# Patient Record
Sex: FEMALE | Race: WHITE
[De-identification: names, ages, dates, MRNs, and addresses within clinical notes are randomized per-mention and may not be internally consistent; named-entity substitution may affect disease eponyms.]

---

## 2019-08-29 ENCOUNTER — HOSPITAL ENCOUNTER (INPATIENT)
Dept: HOSPITAL 1 - ED | Age: 69
LOS: 1 days | Discharge: HOME | DRG: 206 | End: 2019-08-30
Attending: HOSPITALIST | Admitting: HOSPITALIST
Payer: COMMERCIAL

## 2019-08-29 VITALS — DIASTOLIC BLOOD PRESSURE: 51 MMHG | SYSTOLIC BLOOD PRESSURE: 105 MMHG

## 2019-08-29 VITALS — DIASTOLIC BLOOD PRESSURE: 68 MMHG | SYSTOLIC BLOOD PRESSURE: 147 MMHG

## 2019-08-29 VITALS
HEIGHT: 62 IN | WEIGHT: 259.5 LBS | WEIGHT: 259.5 LBS | BODY MASS INDEX: 47.75 KG/M2 | HEIGHT: 62 IN | BODY MASS INDEX: 47.75 KG/M2

## 2019-08-29 VITALS — SYSTOLIC BLOOD PRESSURE: 116 MMHG | DIASTOLIC BLOOD PRESSURE: 56 MMHG

## 2019-08-29 VITALS — DIASTOLIC BLOOD PRESSURE: 60 MMHG | SYSTOLIC BLOOD PRESSURE: 113 MMHG

## 2019-08-29 DIAGNOSIS — E03.9: ICD-10-CM

## 2019-08-29 DIAGNOSIS — I48.2: ICD-10-CM

## 2019-08-29 DIAGNOSIS — I10: ICD-10-CM

## 2019-08-29 DIAGNOSIS — I07.1: ICD-10-CM

## 2019-08-29 DIAGNOSIS — E83.51: ICD-10-CM

## 2019-08-29 DIAGNOSIS — F41.9: ICD-10-CM

## 2019-08-29 DIAGNOSIS — M94.0: Primary | ICD-10-CM

## 2019-08-29 DIAGNOSIS — E87.6: ICD-10-CM

## 2019-08-29 DIAGNOSIS — Z79.01: ICD-10-CM

## 2019-08-29 LAB
ALBUMIN SERPL-MCNC: 3 G/DL (ref 3.4–5)
ALP SERPL-CCNC: 91 U/L (ref 46–116)
ALT SERPL-CCNC: 14 U/L (ref 14–59)
AST SERPL-CCNC: 13 U/L (ref 15–37)
BASOPHILS NFR BLD: 0.2 % (ref 0–2)
BILIRUB SERPL-MCNC: 0.4 MG/DL (ref 0.2–1)
BUN SERPL-MCNC: 21 MG/DL (ref 7–18)
CALCIUM SERPL-MCNC: 8.2 MG/DL (ref 8.5–10.1)
CHLORIDE SERPL-SCNC: 106 MMOL/L (ref 98–107)
CHOLEST SERPL-MCNC: 149 MG/DL (ref ?–200)
CHOLEST/HDLC SERPL: 2.9 MG/DL
CO2 SERPL-SCNC: 23.8 MMOL/L (ref 21–32)
CREAT SERPL-MCNC: 0.9 MG/DL (ref 0.6–1)
ERYTHROCYTE [DISTWIDTH] IN BLOOD BY AUTOMATED COUNT: 16.3 % (ref 11.5–14.5)
GFR SERPLBLD BASED ON 1.73 SQ M-ARVRAT: > 60 ML/MIN
GLUCOSE SERPL-MCNC: 112 MG/DL (ref 74–106)
HDLC SERPL-MCNC: 51 MG/DL (ref 40–60)
MAGNESIUM SERPL-MCNC: 2.3 MG/DL (ref 1.8–2.4)
PHOSPHATE SERPL-MCNC: 3.5 MG/DL (ref 2.5–4.9)
PLATELET # BLD: 346 X10^3MCL (ref 130–400)
POTASSIUM SERPL-SCNC: 4.1 MMOL/L (ref 3.5–5.1)
PROT SERPL-MCNC: 7.2 G/DL (ref 6.4–8.2)
SODIUM SERPL-SCNC: 141 MMOL/L (ref 136–145)
TRIGL SERPL-MCNC: 38 MG/DL (ref ?–150)

## 2019-08-29 PROCEDURE — G0378 HOSPITAL OBSERVATION PER HR: HCPCS

## 2019-08-29 NOTE — NUR
PATIENT UP TO BATHROOM AND BACK TO BED, NO COMPLAIN. FAMILY MEMBER REMAIN AT
BEDSIDE. PT TOLERATED CARDIAC DIET, NO N/V. CONT TO MONITOR.

## 2019-08-29 NOTE — NUR
PATIENT SAT UP IN CHAIR EATING HER DINNER NO COMPLAINS, FAMILY MEMBERS AT
BEDSIDE. XARELTO PO ADMINISTERED. NEEDS MET. CALL LIGHT WITHIN REACH.

## 2019-08-29 NOTE — NUR
PATIENT RESTING IN BED WITH FAMILY MEMBERS AT BEDSIDE, DR. GRANGER SEEN PATIENT
FOR CONSULT AT THIS TIME.

## 2019-08-29 NOTE — NUR
RECEIVED PATIENT FROM ED. AWAKE/ALERT/ORIENTED X4, WALK WITH WALKER TO
BATHROOM AND BACK TO BED VERY STEADY GAIT, NO WEAKNESS NOTED. C/O HEADACHE
10/10. INFORM PATIENT WILL GET PATIENT ADMITTED AND WILL GIVE SOMETHING FOR
HA, DTR AT BEDSIDE. TELE #17 PLACE ON PATIENT, AFIB W/ HR 81, CONT TO MONITOR.

## 2019-08-29 NOTE — NUR
PT C/O INSOMNIA, STATES FEELING VERY ANXIOUS. RECEIVED ORDER FROM DR CRISTOBAL
FOR WRAREN. ALEXIEN GIVEN PER MAR. WILL CONTINUE TO MONITOR.

## 2019-08-29 NOTE — NUR
DR. CAIN ROUND WITH RESIDENT DISCUSS POC WITH PATIENT AND FAMILY MEMBERS AT
BEDSIDE, PLAN FOR CARDIAC WORKUP AND CARDIO CONSULT.

## 2019-08-29 NOTE — NUR
REPORT GIVEN TO CESAR CINTRON, UPDATE DON STATUS, ALBS AND VITALS. PT STABLE FOR
TRANSFER. PT IN NAD.

## 2019-08-29 NOTE — NUR
PT STATES RELIEF OF H/A PAIN. RATES PAIN AT 2/10. PT C/O ANXIETY. RECEIVED
ORDER FROM DR CRISTOBAL FOR ATIVAN PO. MEDICATED WITH ATIVAN PRN PER MAR. WILL
CONTINUE TO MONITOR.

## 2019-08-29 NOTE — NUR
PT PRESENTS TO ER TODAY WITH C/O OF CHEST PAIN THAT STARTED APPROX 5 HRS AGO.
PT STATES THAT PAIN IS PRESSURE LIKE AND NON-RADIATING. PT RATES HER PAIN A
4/10. PT DENIES ANY SOB, PT LUNG SOUNDS ARE CLEAR IN ALL FIELDS WITH
AUSCULTATION. PT ALSO REPORTS SHE WAS UNABLE TO SLEEP LAST NIGHT BECAUSE SHE
WAS WORRIED ABOUT HER HIGH BP READING AT HOME. PT REPORTSHER BP /106 AT
HOME. PT IS A/O X4. RESP ARE EQUAL AND UNLABORED. NO ACUTE DISTRESS NOTED.
FAMILY AT BEDSIDE.

## 2019-08-29 NOTE — NUR
RECEIVED REPORT FROM AM NURSE. PT AAOX4, FOLLOW COMMANDS. ABLE TO MAKE NEEDS
KNOWN. TELE#17 READING A-FIB. DENIES CP/PRESSURE. C/O HEADACHE RATES AT 5/10
PAIN. MEDICATED WITH PRN TYLENOL PER MAR. PALPABLE PULSES TO ALL EXTREMETIES.
BLE PITTING EDEMA NOTED. LUNG SOUNDS CTA. BREATHING EVEN AND UNLABORED ON RA.
NO SOB NOTED. NO ACUTE DISTRESS NOTED.  ABD SOFT AND NONDISTENDED. ACTUVE BS
X4 QUAD. DENIES N/V/D. LAST BM 8/29/19. VOIDS FREELY BRP. AMBULATORY. IV TO
JONATAN SL FLUSHING WELL. SITE FREE FROM REDNESS AND SWELLING. BED AT LOWEST
SETTING. SIDE RAILS X2 UP. CALL LIGHT WITHING REACH. WILL CONTINUE TO MONITOR.

## 2019-08-29 NOTE — NUR
REPORT RECEIVED, PT SITTING UPIN BED, IN NAD. DENIES ANY CP OR SOB AT THIS
TIME, FAMILY AT BEDSIDE. IV INSERTED TO RT UPPER ARM , FIRST ATTEMPT,PT
TOLERATED WELL. VSS. WAIITMG FOR DISPO. SAFETY PRECAUTIONS IN PLACE, WILL CONT
TO MONITOR.

## 2019-08-29 NOTE — NUR
PATIENT SAT UP IN BED WITH FAMILY MEMBERS AT BEDSIDE, INTERN INTERVIEW
PATIENT. NORCO 1 TAB AND ALL PO MEDS ADMINISTERED. HA 10/10. PATIENT TOLERATED
PILLS WELL. CONT TO MONITOR.

## 2019-08-30 VITALS — DIASTOLIC BLOOD PRESSURE: 69 MMHG | SYSTOLIC BLOOD PRESSURE: 141 MMHG

## 2019-08-30 VITALS — SYSTOLIC BLOOD PRESSURE: 141 MMHG | DIASTOLIC BLOOD PRESSURE: 69 MMHG

## 2019-08-30 VITALS — DIASTOLIC BLOOD PRESSURE: 80 MMHG | SYSTOLIC BLOOD PRESSURE: 137 MMHG

## 2019-08-30 LAB
AMPHETAMINES UR QL SCN: (no result)
BASOPHILS NFR BLD: 0.7 % (ref 0–2)
BUN SERPL-MCNC: 18 MG/DL (ref 7–18)
CALCIUM SERPL-MCNC: 8.3 MG/DL (ref 8.5–10.1)
CHLORIDE SERPL-SCNC: 106 MMOL/L (ref 98–107)
CO2 SERPL-SCNC: 25.1 MMOL/L (ref 21–32)
CREAT SERPL-MCNC: 0.8 MG/DL (ref 0.6–1)
ERYTHROCYTE [DISTWIDTH] IN BLOOD BY AUTOMATED COUNT: 16.9 % (ref 11.5–14.5)
GFR SERPLBLD BASED ON 1.73 SQ M-ARVRAT: > 60 ML/MIN
GLUCOSE SERPL-MCNC: 74 MG/DL (ref 74–106)
MAGNESIUM SERPL-MCNC: 2.5 MG/DL (ref 1.8–2.4)
MICROSCOPIC UR-IMP: YES
PHOSPHATE SERPL-MCNC: 3.9 MG/DL (ref 2.5–4.9)
PLATELET # BLD: 315 X10^3MCL (ref 130–400)
POTASSIUM SERPL-SCNC: 4.1 MMOL/L (ref 3.5–5.1)
RBC # UR STRIP.AUTO: NEGATIVE /UL
SODIUM SERPL-SCNC: 143 MMOL/L (ref 136–145)
T3 SERPL-MCNC: 0.73 NG/ML
T4 FREE SERPL-MCNC: 1.23 NG/DL (ref 0.76–1.46)
UA SPECIFIC GRAVITY: <=1.005 (ref 1–1.03)

## 2019-08-30 NOTE — NUR
RECEIVED PT FROM NIGHT SHIFT. PT AWAKE, ALERT. A/OX4. DENIES HEADACHE. PT ON
ROOM AIR WITH NO RESP DISTRESS NOTED. PT ON TELE 17, DENIES CHEST PAIN.
PERIPHERAL PULSES PALPABLE, 4+ EDEMA NOTED TO BLE. ACTIVE BS NOTED. PT DENIES
ISSUES WITH ELIMINATION AT THIS TIME. PT AMBULATORY. IV ACCESS JONATAN CDI. SAFETY
MEASURES IN PLACE, BED LOW AND LOCKED. CALL LIGHT WITHIN REACH.

## 2019-08-30 NOTE — NUR
PT SLEPT WELL THROGHOUT THE NIGHT. BREATHING EVEN AND UNLABORED ON RA. NO
ACUTE DISTRESS NOTED. DENIES CP/PRESSURE.  NO SIGNIFICANT CHANGE DURING SHIFT.
ALL NEEDS ASSESSED AND ATTENDED TO. BED AT LOWEST SETTING. SIDE RAILS X2 UP.
CALL LIGHT WITHING REACH. WILL ENDORSE CARE TO AM NURSE.

## 2019-08-30 NOTE — NUR
PT LAYING DOWN IN BED WITH EYES CLOSED. BREATHING EVEN AND UNLABORED ON RA. NO
ACUTE DISTRESS NOTED. BED AT LOWEST SETTING. SIDE RAILS X2 UP. CALL LIGHT
WHITHING REACH. WILL CONTINUE TO MONITOR.

## 2019-08-30 NOTE — NUR
DUE MEDICATIONS ADMINISTERED AS ORDERED. PT TOLERATED WELL. FAMILY AT BEDSIDE.
PT DENIES ANY CHEST PAIN AT THIS TIME.

## 2019-08-30 NOTE — NUR
PT REPORTS HEADACHE IS BETTER. DISCHARGE INSTRUCTIONS/EDUCATION PROVIDED TO
PATIENT AND FAMILY. PT TO FOLLOW UP WITH PCP WITH APPT GIVEN. IV ACCESS
REMOVED WITH CATHETER INTACT. NO SWELLING OR BLEEDING NOTED. TELE REMOVED AND
RETURNED TO TELE TECH. PT TAKEN TO [facility] BY WHEELCHAIR FOR DISCHARGE.

## 2019-08-30 NOTE — NUR
PT SITTING IN BED EATING LUNCH. PT COMPLAINING OF A HEADACHE 7/10. TYLENOL
ADMINISTERED AS ORDERED PRN (SEE EMAR). WILL CONT TO MONITOR.

## 2020-02-24 ENCOUNTER — HOSPITAL ENCOUNTER (EMERGENCY)
Dept: HOSPITAL 1 - ED | Age: 70
Discharge: LEFT BEFORE DISCHARGE | End: 2020-02-24
Payer: COMMERCIAL

## 2020-02-24 VITALS — BODY MASS INDEX: 46.01 KG/M2 | HEIGHT: 62 IN | WEIGHT: 250 LBS

## 2020-02-24 VITALS — DIASTOLIC BLOOD PRESSURE: 84 MMHG | SYSTOLIC BLOOD PRESSURE: 138 MMHG

## 2020-02-24 DIAGNOSIS — Z53.21: Primary | ICD-10-CM
